# Patient Record
Sex: FEMALE | Race: BLACK OR AFRICAN AMERICAN | NOT HISPANIC OR LATINO | URBAN - METROPOLITAN AREA
[De-identification: names, ages, dates, MRNs, and addresses within clinical notes are randomized per-mention and may not be internally consistent; named-entity substitution may affect disease eponyms.]

---

## 2023-01-05 DIAGNOSIS — S82.232D CLOSED DISPLACED OBLIQUE FRACTURE OF SHAFT OF LEFT TIBIA WITH ROUTINE HEALING, SUBSEQUENT ENCOUNTER: Primary | ICD-10-CM

## 2023-01-09 ENCOUNTER — CLINICAL SUPPORT (OUTPATIENT)
Dept: REHABILITATION | Facility: HOSPITAL | Age: 38
End: 2023-01-09
Payer: MEDICAID

## 2023-01-09 DIAGNOSIS — M25.662 STIFFNESS OF LEFT KNEE: ICD-10-CM

## 2023-01-09 DIAGNOSIS — M25.672 STIFFNESS OF LEFT ANKLE JOINT: ICD-10-CM

## 2023-01-09 DIAGNOSIS — R26.89 OTHER ABNORMALITIES OF GAIT AND MOBILITY: ICD-10-CM

## 2023-01-09 DIAGNOSIS — M62.81 MUSCLE WEAKNESS: Primary | ICD-10-CM

## 2023-01-09 DIAGNOSIS — S82.232D CLOSED DISPLACED OBLIQUE FRACTURE OF SHAFT OF LEFT TIBIA WITH ROUTINE HEALING, SUBSEQUENT ENCOUNTER: ICD-10-CM

## 2023-01-09 PROCEDURE — 97110 THERAPEUTIC EXERCISES: CPT | Mod: PO

## 2023-01-09 PROCEDURE — 97161 PT EVAL LOW COMPLEX 20 MIN: CPT | Mod: PO

## 2023-01-09 NOTE — PLAN OF CARE
[facility]   Physical Therapy Initial Evaluation     Date: 1/9/2023   Name: Tiana Herr  Clinic Number: [id number]    Therapy Diagnosis: No diagnosis found.  Physician: Greg Dockery PA    Physician Orders: PT Eval and Treat   Medical Diagnosis from Referral: closed displaced oblique fracture of shaft of left tibia  Evaluation Date: 1/9/2023  Authorization Period Expiration: 12/31/23  Plan of Care Expiration: 4/9/23  Progress Note Due: 2/9/23  Visit # / Visits authorized: 1/ 1   FOTO: Issued Visit #: 1/3 (1/9/23)        Precautions: Standard and Weightbearing     Time In: 2:11 pm  Time Out: 2:55 pm  Total Appointment Time (timed & untimed codes): 44 minutes      SUBJECTIVE   Date of onset: December 14, 2022    History of current condition - Tiana reports: that she fell out of her attic. She had surgery the next day. She has been in the boot since she got out of the hospital. She has been walking around her house at night without the boot. She returns to her surgeon on February 10th. She was not instructed in any boot wearing schedule. She has not taken any pain pills in two weeks. It is a little achy and sore. She reports that her knee is really hurting a well. She has pain into the ankle as well. She does not have full mobility of the ankle. The fracture is right above the ankle. She states that her surgeon told her she is allowed to do as much as she can tolerate.2    Falls: yes, cause of initial injury    Imaging, x-ray: There is a nail in place across the distal tibial fracture with unchanged alignment. There is no clear hardware complication. There is patellar enthesopathy.    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    Surgical History:   Tiana Herr  has no past surgical history on file.    Medications:   Tiana currently has no medications in their medication list.    Allergies:   Review of patient's allergies indicates:  Not on File       OBJECTIVE      Left  AROM  PROM    Ankle Dorsiflexion  Lacking 15  Lacking 10    Ankle Plantarflexion  35  38    Ankle Inversion  12      Ankle Eversion  15          Right  AROM  PROM    Ankle Dorsiflexion  17  20    Ankle Plantarflexion  40  50    Ankle Inversion  35      Ankle Eversion  20         Left (not tested)  Right    Ankle Dorsiflexion  5/5    Ankle Plantarflexion  4/5    Ankle Inversion  5/5    Ankle Eversion  5/5       Left  Right    Knee Flexion  NT 4+/5    Knee Extension  NT 5/5    Hip Abduction 4-/5 4-/5    Hip Extension 4-/5 4-/5     Patient unable to perform straight leg raise on left      Left  Right    Knee Flexion  74 120    Knee Extension 3 degrees hyper 3 degrees hyper         Girth:      Left (cm)  Right (cm)    Girth  52 48     Gait:  Ambulates with boot, significant hip shift secondary to altered leg length from boot       Limitation/Restriction for FOTO  Survey    Therapist reviewed FOTO scores for Tiana Herr on 1/9/2023.   FOTO documents entered into [employer] - see Media section.    Limitation Score: 35%         TREATMENT     Plan for Next Visit:  Ankle passive range of motion/active range of motion, Neuromuscular Electrical Stimulation to quads (quad set, slr, Short arc quad), heel slides, hamstring strengthening, glute strengthening    Total Treatment time (time-based codes) separate from Evaluation: 12 minutes      Tiana received the treatments listed below:       therapeutic exercises to develop strength and endurance for 12 minutes including:  Long Sitting Quad Set with Towel Roll Under Heel - 1 x daily - 7 x weekly - 3 sets - 10 reps - 5 sec hold  Supine Ankle Pumps - 1 x daily - 7 x weekly - 3 sets - 10 reps      PATIENT EDUCATION AND HOME EXERCISES     Education provided:   - use of ice rather than heat to manage swelling  - importance of elevating for fluid drainage  - perform ambulation only with boot donned    Written Home Exercises Provided: yes. Exercises were reviewed and Tiana was able to demonstrate them prior to the end of the session.  Tiana demonstrated good  understanding of the education provided. See EMR under Patient Instructions for exercises provided during therapy sessions.    Access Code: [email or web address]  URL: [email or web address]  Date: 01/09/2023  Prepared by: Yadira Mar    Exercises  Long Sitting Quad Set with Towel Roll Under Heel - 1 x daily - 7 x weekly - 3 sets - 10 reps - 5 sec hold  Supine Ankle Pumps - 1 x daily - 7 x weekly - 3 sets - 10 reps      ASSESSMENT     Tiana is a 37 y.o. female referred to outpatient Physical Therapy with a medical diagnosis of closed displaced oblique fracture of shaft of left tibia. Patient presents with significant loss of ankle/knee passive range of motion, suspected weakness of ankle musculature, and significant quadriceps/hamstring weakness in right lower extremity. Patient is currently unable to perform straight leg raise on left lower extremity due to this weakness. Significant swelling is present as well. Patient was educated on avoiding unprotected weightbearing in the left lower extremity until more time has passed; she verbalized agreement to bear weight only in boot. Based on current presentation, physical therapy intervention is medically necessary to maximize functional outcomes.    Patient prognosis is Good.   Patientt will benefit from skilled outpatient Physical Therapy to address the  deficits stated above and in the chart below, provide patient /family education, and to maximize patientt's level of independence.     Plan of care discussed with patient: Yes  Patient's spiritual, cultural and educational needs considered and patient is agreeable to the plan of care and goals as stated below:     Anticipated Barriers for therapy: work schedule    Medical Necessity is demonstrated by the following  History  Co-morbidities and personal factors that may impact the plan of care Co-morbidities:   none    Personal Factors:   no deficits     low   Examination  Body Structures and Functions, activity limitations and participation restrictions that may impact the plan of care Body Regions:   lower extremities    Body Systems:    ROM  strength  balance  gait  transfers  motor control    Participation Restrictions:   recreation    Activity limitations:   Learning and applying knowledge  no deficits    General Tasks and Commands  no deficits    Communication  no deficits    Mobility  walking    Self care  washing oneself (bathing, drying, washing hands)  caring for body parts (brushing teeth, shaving, grooming)  dressing    Domestic Life  shopping  cooking  doing house work (cleaning house, washing dishes, laundry)    Interactions/Relationships  no deficits    Life Areas  employment    Community and Social Life  community life  recreation and leisure         moderate   Clinical Presentation stable and uncomplicated low   Decision Making/ Complexity Score: low     Goals:  Short Term Goals: 4 weeks   Patient will restore passive left ankle dorsiflexion range of motion to within 5 degrees of contralateral side.  Patient will restore passive left ankle plantarflexion range of motion to within 5 degrees of contralateral side.  Patient will restore passive left ankle inversion range of motion to within 5 degrees of contralateral side.  Patient will restore passive left ankle eversion range of motion to within 5  degrees of contralateral side.  Patient will be compliant with home exercise program.    Long Term Goals: 8 weeks   Patient will restore left active ankle dorsiflexion range of motion to within 5 degrees of contralateral side.  Patient will restore left active ankle plantarflexion range of motion to within 5 degrees of contralateral side.  Patient will restore left active ankle inversion range of motion to within 5 degrees of contralateral side.  Patient will restore  left active ankle eversion range of motion to within 5 degrees of contralateral side.  Patient will perform 30 repetitions of straight leg raise with no extensor lag indicating sufficient quad function for gait.  Patient will improve global left ankle strength to > or = 5/5 to allow return to prior level.   Patient will improve left quadriceps/hamstring strength to > or = 5/5 to allow return to prior level.  Patient will restore left knee flexion to > or = 120 degrees active in supine.  Patient will be independent in management.  Patient will ambulate 250' with no evidence of gait abnormalities.    PLAN   Plan of care Certification: 1/9/2023 to 4/9/23.      Outpatient Physical Therapy 2-3 times weekly for 8 weeks to include the following interventions: Gait Training, Manual Therapy, Moist Heat/ Ice, Neuromuscular Re-ed, Patient Education, Self Care, Therapeutic Activities, and Therapeutic Exercise.     Yadira Mar, PT      I CERTIFY THE NEED FOR THESE SERVICES FURNISHED UNDER THIS PLAN OF TREATMENT AND WHILE UNDER MY CARE   Physician's comments:     Physician's Signature: ___________________________________________________

## 2023-01-17 ENCOUNTER — CLINICAL SUPPORT (OUTPATIENT)
Dept: REHABILITATION | Facility: HOSPITAL | Age: 38
End: 2023-01-17
Payer: MEDICAID

## 2023-01-17 DIAGNOSIS — M25.672 STIFFNESS OF LEFT ANKLE JOINT: Primary | ICD-10-CM

## 2023-01-17 DIAGNOSIS — M25.662 STIFFNESS OF LEFT KNEE: ICD-10-CM

## 2023-01-17 DIAGNOSIS — R26.89 OTHER ABNORMALITIES OF GAIT AND MOBILITY: ICD-10-CM

## 2023-01-17 PROCEDURE — 97110 THERAPEUTIC EXERCISES: CPT | Mod: PO,CQ

## 2023-01-17 NOTE — PROGRESS NOTES
[facility]   Physical Therapy Treatment Note     Name: Tiana Herr  Clinic Number: [id number]    Therapy Diagnosis:   Encounter Diagnoses   Name Primary?    Stiffness of left ankle joint Yes    Other abnormalities of gait and mobility     Stiffness of left knee      Physician: Greg Dockery PA    Visit Date: 1/17/2023  Physician Orders: PT Eval and Treat   Medical Diagnosis from Referral: closed displaced oblique fracture of shaft of left tibia  Evaluation Date: 1/9/2023  Authorization Period Expiration: 12/31/23  Plan of Care Expiration: 4/9/23  Progress Note Due: 2/9/23  Visit # / Visits authorized: 1/ 20  FOTO: Issued Visit #: 1/3 (1/9/23)    PTA Visit #: 1/5     Precautions: Standard and Weightbearing     Time In: 1535  Time Out: 1628  Total Billable Time: 53 minutes    SUBJECTIVE     Pt reports: her ankle is coming along but her knee is really bothersome. Patient states her ankle is currently pain free. She was compliant with home exercise program.  Response to previous treatment: no adverse effects   Functional change: too soon to determine     Pain: 0/10  Location: left ankles     OBJECTIVE     Objective Measures updated at progress report unless specified.     Treatment     Tiana received the treatments listed below:      therapeutic exercises to develop strength, endurance, ROM, flexibility, posture, and core stabilization for 43 minutes including:  PROM to (L) ankle x 5 minute  Long sitting gastroc stretch with strap x 1 minute x 3   Long sitting ankle pumps x 2 minutes  Long sitting inversion/eversion x 2 minutes  Quad set 2x10  SAQ 2x10, 3#   SLR 2x5  S/L hip abduction 2x10    Seated windshield wiper x 2 minutes   Seated rockerboard x 2 minutes  Seated heel raises 2x10    manual therapy techniques: Joint mobilizations and Soft tissue Mobilization were applied to the: (L) ankle for 10 minutes, including:  A/P joint mobilizations to improve DF   Calcaneal mobs      Patient Education and Home Exercises     Home Exercises Provided and Patient Education Provided     Education provided:   - HEP compliance      Written Home Exercises Provided: Patient instructed to cont prior HEP. Exercises were reviewed and Tiana was able to demonstrate them prior to the end of the session.  Tiana demonstrated good  understanding of the education provided. See EMR under Patient Instructions for exercises provided during therapy sessions    ASSESSMENT     Tiana demonstrates decreased (L) ankle ROM in all planes. Focused a bit more on restoring DF today. Patient struggling with LE strengthening due to muscle weakness but she is able to perform without complaints of knee or ankle pain. Much improved ability to perform SLR today following quad sets and SAQ exercises.     Tiana Is progressing well towards her goals.   Pt prognosis is Good.     Pt will continue to benefit from skilled outpatient physical therapy to address the deficits listed in the problem list box on initial evaluation, provide pt/family education and to maximize pt's level of independence in the home and community environment.     Pt's spiritual, cultural and educational needs considered and pt agreeable to plan of care and goals.     Anticipated barriers to physical therapy: work schedule    Goals:   Short Term Goals: 4 weeks   Patient will restore passive left ankle dorsiflexion range of motion to within 5 degrees of contralateral side.  Patient will restore passive left ankle plantarflexion range of motion to within 5 degrees of contralateral side.  Patient will restore passive left ankle inversion range of motion to within 5 degrees of contralateral side.  Patient will restore passive left ankle eversion range of motion to within 5 degrees of contralateral side.  Patient will be compliant with home exercise program.     Long Term Goals: 8 weeks   Patient will restore left active ankle dorsiflexion range of motion to within 5  degrees of contralateral side.  Patient will restore left active ankle plantarflexion range of motion to within 5 degrees of contralateral side.  Patient will restore left active ankle inversion range of motion to within 5 degrees of contralateral side.  Patient will restore  left active ankle eversion range of motion to within 5 degrees of contralateral side.  Patient will perform 30 repetitions of straight leg raise with no extensor lag indicating sufficient quad function for gait.  Patient will improve global left ankle strength to > or = 5/5 to allow return to prior level.   Patient will improve left quadriceps/hamstring strength to > or = 5/5 to allow return to prior level.  Patient will restore left knee flexion to > or = 120 degrees active in supine.  Patient will be independent in management.  Patient will ambulate 250' with no evidence of gait abnormalities.    PLAN     Continue current POC with emphasis on restoring ankle ROM and LE strength.     Amelia Pan, PTA

## 2023-01-19 ENCOUNTER — CLINICAL SUPPORT (OUTPATIENT)
Dept: REHABILITATION | Facility: HOSPITAL | Age: 38
End: 2023-01-19
Payer: MEDICAID

## 2023-01-19 DIAGNOSIS — M25.662 STIFFNESS OF LEFT KNEE: ICD-10-CM

## 2023-01-19 DIAGNOSIS — R26.89 OTHER ABNORMALITIES OF GAIT AND MOBILITY: ICD-10-CM

## 2023-01-19 DIAGNOSIS — M25.672 STIFFNESS OF LEFT ANKLE JOINT: Primary | ICD-10-CM

## 2023-01-19 PROCEDURE — 97110 THERAPEUTIC EXERCISES: CPT | Mod: PO

## 2023-01-19 NOTE — PROGRESS NOTES
[facility]   Physical Therapy Treatment Note     Name: Tiana Herr  Clinic Number: [id number]    Therapy Diagnosis:   Encounter Diagnoses   Name Primary?    Stiffness of left ankle joint Yes    Other abnormalities of gait and mobility     Stiffness of left knee      Physician: Greg Dockery PA    Visit Date: 1/19/2023  Physician Orders: PT Eval and Treat   Medical Diagnosis from Referral: closed displaced oblique fracture of shaft of left tibia  Evaluation Date: 1/9/2023  Authorization Period Expiration: 12/31/23  Plan of Care Expiration: 4/9/23  Progress Note Due: 2/9/23  Visit # / Visits authorized: 1/ 20  FOTO: Issued Visit #: 1/3 (1/9/23)    PTA Visit #: 1/5     Precautions: Standard and Weightbearing     Time In: 4:00 pm  Time Out: 4:50 pm  Total Billable Time: 26 minutes    SUBJECTIVE     Pt reports: she feels she is improving.  She was compliant with home exercise program.  Response to previous treatment: no adverse effects   Functional change: too soon to determine     Pain: 0/10  Location: left ankles     OBJECTIVE     Objective Measures updated at progress report unless specified.     Treatment     Plan for Next Visit:  Resume mobilizations, knee range of motion     Tiana received the treatments listed below:      therapeutic exercises to develop strength, endurance, ROM, flexibility, posture, and core stabilization for 50 minutes including:  PROM to (L) ankle x 5 minute  Long sitting gastroc stretch with strap x 1 minute x 3   Long sitting ankle pumps x 5  Long sitting inversion/eversion x 2 minutes  Quad set 2x10  SAQ 3x10, 4#   SLR 2x10  S/L hip abduction 2x10    Seated Heel Raises 30x  Seated windshield wiper x 2 minutes   Seated rockerboard x 2 minutes      manual therapy techniques: Joint mobilizations and Soft tissue Mobilization were applied to the: (L) ankle for 00 minutes, including:  A/P joint mobilizations to improve DF   Calcaneal mobs     Patient  Education and Home Exercises     Home Exercises Provided and Patient Education Provided     Education provided:   - HEP compliance      Written Home Exercises Provided: Patient instructed to cont prior HEP. Exercises were reviewed and Tiana was able to demonstrate them prior to the end of the session.  Tiana demonstrated good  understanding of the education provided. See EMR under Patient Instructions for exercises provided during therapy sessions    ASSESSMENT     Significant improvements in quadriceps engagement noted this session with patient able to progress to sets of 10 repetitions. Verbal cues were required to attain terminal knee extension prior to lifting. Some improvements in neuromuscular control were noted with inversion/eversion with patient able to isolate ankle motion with decreased assistance. Continued physical therapy intervention is medically necessary to maximize functional outcomes.    Tiana Is progressing well towards her goals.   Pt prognosis is Good.     Pt will continue to benefit from skilled outpatient physical therapy to address the deficits listed in the problem list box on initial evaluation, provide pt/family education and to maximize pt's level of independence in the home and community environment.     Pt's spiritual, cultural and educational needs considered and pt agreeable to plan of care and goals.     Anticipated barriers to physical therapy: work schedule    Goals:   Short Term Goals: 4 weeks   Patient will restore passive left ankle dorsiflexion range of motion to within 5 degrees of contralateral side.  Patient will restore passive left ankle plantarflexion range of motion to within 5 degrees of contralateral side.  Patient will restore passive left ankle inversion range of motion to within 5 degrees of contralateral side.  Patient will restore passive left ankle eversion range of motion to within 5 degrees of contralateral side.  Patient will be compliant with home exercise  program.     Long Term Goals: 8 weeks   Patient will restore left active ankle dorsiflexion range of motion to within 5 degrees of contralateral side.  Patient will restore left active ankle plantarflexion range of motion to within 5 degrees of contralateral side.  Patient will restore left active ankle inversion range of motion to within 5 degrees of contralateral side.  Patient will restore  left active ankle eversion range of motion to within 5 degrees of contralateral side.  Patient will perform 30 repetitions of straight leg raise with no extensor lag indicating sufficient quad function for gait.  Patient will improve global left ankle strength to > or = 5/5 to allow return to prior level.   Patient will improve left quadriceps/hamstring strength to > or = 5/5 to allow return to prior level.  Patient will restore left knee flexion to > or = 120 degrees active in supine.  Patient will be independent in management.  Patient will ambulate 250' with no evidence of gait abnormalities.    PLAN     Continue current POC with emphasis on restoring ankle ROM and LE strength.     Yadira Mar, PT

## 2023-01-23 ENCOUNTER — CLINICAL SUPPORT (OUTPATIENT)
Dept: REHABILITATION | Facility: HOSPITAL | Age: 38
End: 2023-01-23
Payer: MEDICAID

## 2023-01-23 DIAGNOSIS — R26.89 OTHER ABNORMALITIES OF GAIT AND MOBILITY: ICD-10-CM

## 2023-01-23 DIAGNOSIS — M25.662 STIFFNESS OF LEFT KNEE: ICD-10-CM

## 2023-01-23 DIAGNOSIS — M25.672 STIFFNESS OF LEFT ANKLE JOINT: Primary | ICD-10-CM

## 2023-01-23 PROCEDURE — 97110 THERAPEUTIC EXERCISES: CPT | Mod: PO,CQ

## 2023-01-23 NOTE — PROGRESS NOTES
[facility]   Physical Therapy Treatment Note     Name: Tiana Herr  Clinic Number: [id number]    Therapy Diagnosis:   Encounter Diagnoses   Name Primary?    Stiffness of left ankle joint Yes    Other abnormalities of gait and mobility     Stiffness of left knee      Physician: Greg Dockery PA    Visit Date: 1/23/2023  Physician Orders: PT Eval and Treat   Medical Diagnosis from Referral: closed displaced oblique fracture of shaft of left tibia  Evaluation Date: 1/9/2023  Authorization Period Expiration: 12/31/23  Plan of Care Expiration: 4/9/23  Progress Note Due: 2/9/23  Visit # / Visits authorized: 3/ 20  FOTO: Issued Visit #: 1/3 (1/9/23)    PTA Visit #: 1/5     Precautions: Standard and Weightbearing     Time In: 1440  Time Out: 1525  Total Billable Time: 25 minutes    SUBJECTIVE     Pt reports: she is pain free today. Patient states she is feeling better and she is ready to get out of the boot. She was compliant with home exercise program.  Response to previous treatment: no adverse effects   Functional change: too soon to determine     Pain: 0/10  Location: left ankles     OBJECTIVE     Objective Measures updated at progress report unless specified.     Treatment     Tiana received the treatments listed below:      therapeutic exercises to develop strength, endurance, ROM, flexibility, posture, and core stabilization for 35 including:  PROM to (L) ankle x 5 minute  Long sitting gastroc stretch with strap x 1 minute x 3   Long sitting inversion/eversion x 2 minutes  4 way ankle (Red TB) 2x10  Quad set 2x10  SLR 2x10  S/L hip abduction 2x10    Seated Heel Raises 30x  Seated windshield wiper x 2 minutes   Seated rockerboard x 2 minutes  Narrow base on air ex pad x 1 minute x 2   Tomasa-Tomasa for gait mechanics x 1 minute ea LE     manual therapy techniques: Joint mobilizations and Soft tissue Mobilization were applied to the: (L) ankle for 10 minutes, including:  A/P joint  mobilizations to improve DF   Calcaneal mobs     Patient Education and Home Exercises     Home Exercises Provided and Patient Education Provided     Education provided:   - HEP compliance      Written Home Exercises Provided: Patient instructed to cont prior HEP. Exercises were reviewed and Tiana was able to demonstrate them prior to the end of the session.  Tiana demonstrated good  understanding of the education provided. See EMR under Patient Instructions for exercises provided during therapy sessions    ASSESSMENT     Tiana able to progress to ankle ROM against resistance without pain. Mild cues for ROM without compensation with good self correction achieved post instruction. Patient responding very well to progression of LE strengthening. Continued physical therapy intervention is medically necessary to maximize functional outcomes    Tiana Is progressing well towards her goals.   Pt prognosis is Good.     Pt will continue to benefit from skilled outpatient physical therapy to address the deficits listed in the problem list box on initial evaluation, provide pt/family education and to maximize pt's level of independence in the home and community environment.     Pt's spiritual, cultural and educational needs considered and pt agreeable to plan of care and goals.     Anticipated barriers to physical therapy: work schedule    Goals:   Short Term Goals: 4 weeks   Patient will restore passive left ankle dorsiflexion range of motion to within 5 degrees of contralateral side.  Patient will restore passive left ankle plantarflexion range of motion to within 5 degrees of contralateral side.  Patient will restore passive left ankle inversion range of motion to within 5 degrees of contralateral side.  Patient will restore passive left ankle eversion range of motion to within 5 degrees of contralateral side.  Patient will be compliant with home exercise program.     Long Term Goals: 8 weeks   Patient will restore left  active ankle dorsiflexion range of motion to within 5 degrees of contralateral side.  Patient will restore left active ankle plantarflexion range of motion to within 5 degrees of contralateral side.  Patient will restore left active ankle inversion range of motion to within 5 degrees of contralateral side.  Patient will restore  left active ankle eversion range of motion to within 5 degrees of contralateral side.  Patient will perform 30 repetitions of straight leg raise with no extensor lag indicating sufficient quad function for gait.  Patient will improve global left ankle strength to > or = 5/5 to allow return to prior level.   Patient will improve left quadriceps/hamstring strength to > or = 5/5 to allow return to prior level.  Patient will restore left knee flexion to > or = 120 degrees active in supine.  Patient will be independent in management.  Patient will ambulate 250' with no evidence of gait abnormalities.    PLAN     Continue current POC with emphasis on restoring ankle ROM and LE strength.     Amelia Pan, PTA

## 2023-01-25 ENCOUNTER — CLINICAL SUPPORT (OUTPATIENT)
Dept: REHABILITATION | Facility: HOSPITAL | Age: 38
End: 2023-01-25
Payer: MEDICAID

## 2023-01-25 DIAGNOSIS — M25.662 STIFFNESS OF LEFT KNEE: ICD-10-CM

## 2023-01-25 DIAGNOSIS — M25.672 STIFFNESS OF LEFT ANKLE JOINT: Primary | ICD-10-CM

## 2023-01-25 DIAGNOSIS — R26.89 OTHER ABNORMALITIES OF GAIT AND MOBILITY: ICD-10-CM

## 2023-01-25 PROCEDURE — 97110 THERAPEUTIC EXERCISES: CPT | Mod: PO,CQ

## 2023-01-25 PROCEDURE — 97140 MANUAL THERAPY 1/> REGIONS: CPT | Mod: PO,CQ

## 2023-01-25 NOTE — PROGRESS NOTES
[facility]   Physical Therapy Treatment Note     Name: Tiana Herr  Clinic Number: [id number]    Therapy Diagnosis:   Encounter Diagnoses   Name Primary?    Stiffness of left ankle joint Yes    Other abnormalities of gait and mobility     Stiffness of left knee      Physician: Greg Dockery PA    Visit Date: 1/25/2023  Physician Orders: PT Eval and Treat   Medical Diagnosis from Referral: closed displaced oblique fracture of shaft of left tibia  Evaluation Date: 1/9/2023  Authorization Period Expiration: 12/31/23  Plan of Care Expiration: 4/9/23  Progress Note Due: 2/9/23  Visit # / Visits authorized: 3/ 20  FOTO: Issued Visit #: 1/3 (1/9/23)    PTA Visit #: 1/5     Precautions: Standard and Weightbearing     Time In: 1536  Time Out: 1632  Total Billable Time: 56 minutes    SUBJECTIVE     Pt reports: her ankle was sore following last treatment session but this resolved the following day. She was compliant with home exercise program.  Response to previous treatment: no adverse effects   Functional change: too soon to determine     Pain: 0/10  Location: left ankles     OBJECTIVE     Objective Measures updated at progress report unless specified.     Treatment     Tiana received the treatments listed below:      therapeutic exercises to develop strength, endurance, ROM, flexibility, posture, and core stabilization for 46 including:  Upright bike x 5 minutes (no boot)  Standing gastroc stretch on incline x 1 minute x 2   Self DF mobilization on step x 2 minutes, 5 sec hold   PROM to (L) ankle x 5 minute  Long sitting gastroc stretch with strap x 1 minute x 3   4 way ankle (Red TB) 2x10  SLR 2x10, 2#  S/L hip abduction 2x10, 2#    Seated Heel Raises 30x  Seated windshield wiper x 2 minutes   Seated rockerboard x 2 minutes  Narrow base on air ex pad x 1 minute x 2 (eyes closed)  Tomasa-Tomasa for gait mechanics x 1 minute ea LE     manual therapy techniques: Joint mobilizations and Soft  tissue Mobilization were applied to the: (L) ankle for 10 minutes, including:  A/P joint mobilizations to improve DF   Calcaneal mobs     Patient Education and Home Exercises     Home Exercises Provided and Patient Education Provided     Education provided:   - HEP compliance      Written Home Exercises Provided: Patient instructed to cont prior HEP. Exercises were reviewed and Tiana was able to demonstrate them prior to the end of the session.  Tiana demonstrated good  understanding of the education provided. See EMR under Patient Instructions for exercises provided during therapy sessions    ASSESSMENT     Tiana initially challenged by full revolutions on upright bike due to decreased knee flexion but this resolved with repetition. Good tolerance to weightbearing mobility exercises without provocation of pain. Most ROM deficits noted into plantarflexion and dorsiflexion but she is able to challenge without pain. Continued physical therapy intervention is medically necessary to maximize functional outcomes    Tiana Is progressing well towards her goals.   Pt prognosis is Good.     Pt will continue to benefit from skilled outpatient physical therapy to address the deficits listed in the problem list box on initial evaluation, provide pt/family education and to maximize pt's level of independence in the home and community environment.     Pt's spiritual, cultural and educational needs considered and pt agreeable to plan of care and goals.     Anticipated barriers to physical therapy: work schedule    Goals:   Short Term Goals: 4 weeks   Patient will restore passive left ankle dorsiflexion range of motion to within 5 degrees of contralateral side.  Patient will restore passive left ankle plantarflexion range of motion to within 5 degrees of contralateral side.  Patient will restore passive left ankle inversion range of motion to within 5 degrees of contralateral side.  Patient will restore passive left ankle eversion  range of motion to within 5 degrees of contralateral side.  Patient will be compliant with home exercise program.     Long Term Goals: 8 weeks   Patient will restore left active ankle dorsiflexion range of motion to within 5 degrees of contralateral side.  Patient will restore left active ankle plantarflexion range of motion to within 5 degrees of contralateral side.  Patient will restore left active ankle inversion range of motion to within 5 degrees of contralateral side.  Patient will restore  left active ankle eversion range of motion to within 5 degrees of contralateral side.  Patient will perform 30 repetitions of straight leg raise with no extensor lag indicating sufficient quad function for gait.  Patient will improve global left ankle strength to > or = 5/5 to allow return to prior level.   Patient will improve left quadriceps/hamstring strength to > or = 5/5 to allow return to prior level.  Patient will restore left knee flexion to > or = 120 degrees active in supine.  Patient will be independent in management.  Patient will ambulate 250' with no evidence of gait abnormalities.    PLAN     Continue current POC with emphasis on restoring ankle ROM and LE strength.     Amelia Pan, PTA

## 2023-01-30 ENCOUNTER — CLINICAL SUPPORT (OUTPATIENT)
Dept: REHABILITATION | Facility: HOSPITAL | Age: 38
End: 2023-01-30
Payer: MEDICAID

## 2023-01-30 DIAGNOSIS — R26.89 OTHER ABNORMALITIES OF GAIT AND MOBILITY: ICD-10-CM

## 2023-01-30 DIAGNOSIS — M25.672 STIFFNESS OF LEFT ANKLE JOINT: Primary | ICD-10-CM

## 2023-01-30 DIAGNOSIS — M25.662 STIFFNESS OF LEFT KNEE: ICD-10-CM

## 2023-01-30 PROCEDURE — 97110 THERAPEUTIC EXERCISES: CPT | Mod: PO,CQ

## 2023-01-30 NOTE — PROGRESS NOTES
[facility]   Physical Therapy Treatment Note     Name: Tiana Herr  Clinic Number: [id number]    Therapy Diagnosis:   Encounter Diagnoses   Name Primary?    Stiffness of left ankle joint Yes    Other abnormalities of gait and mobility     Stiffness of left knee      Physician: Greg Dockery PA    Visit Date: 1/30/2023  Physician Orders: PT Eval and Treat   Medical Diagnosis from Referral: closed displaced oblique fracture of shaft of left tibia  Evaluation Date: 1/9/2023  Authorization Period Expiration: 12/31/23  Plan of Care Expiration: 4/9/23  Progress Note Due: 2/9/23  Visit # / Visits authorized: 5/ 20  FOTO: Issued Visit #: 1/3 (1/9/23)    PTA Visit #: 3/5     Precautions: Standard and Weightbearing     Time In: 1532  Time Out: 1627  Total Billable Time: 55 minutes    SUBJECTIVE     Pt reports: her ankle is doing well and she is walking some at home with tennis shoes. She was compliant with home exercise program.  Response to previous treatment: no adverse effects   Functional change: too soon to determine     Pain: 0/10  Location: left ankles     OBJECTIVE     Objective Measures updated at progress report unless specified.     Treatment     Tiana received the treatments listed below:      therapeutic exercises to develop strength, endurance, ROM, flexibility, posture, and core stabilization for 45 including:  Upright bike x 5 minutes (no boot)  Standing gastroc stretch on incline x 1 minute x 2   Self DF mobilization on step x 2 minutes, 5 sec hold   PROM to (L) ankle x 5 minute  Long sitting gastroc stretch with strap x 1 minute x 3   4 way ankle (Red TB) 2x10  SLR 2x10, 2#  S/L hip abduction 2x10, 2#    Seated Heel Raises 30x  Seated windshield wiper x 2 minutes   Seated rockerboard x 2 minutes  Narrow base on air ex pad x 1 minute x 2 (eyes closed)  Tomasa-Tomasa for gait mechanics x 1 minute ea LE     manual therapy techniques: Joint mobilizations and Soft tissue  Mobilization were applied to the: (L) ankle for 10 minutes, including:  A/P joint mobilizations to improve DF   Calcaneal mobs     Patient Education and Home Exercises     Home Exercises Provided and Patient Education Provided     Education provided:   - HEP compliance      Written Home Exercises Provided: Patient instructed to cont prior HEP. Exercises were reviewed and Tiana was able to demonstrate them prior to the end of the session.  Tiana demonstrated good  understanding of the education provided. See EMR under Patient Instructions for exercises provided during therapy sessions    ASSESSMENT     Tiana demonstrates most ROM deficits into plantarflexion and dorsiflexion but she is able to challenge without pain. Patient to bring tennis shoes to next visit to progress ambulatory specific exercises. Continued physical therapy intervention is medically necessary to maximize functional outcomes    Tiana Is progressing well towards her goals.   Pt prognosis is Good.     Pt will continue to benefit from skilled outpatient physical therapy to address the deficits listed in the problem list box on initial evaluation, provide pt/family education and to maximize pt's level of independence in the home and community environment.     Pt's spiritual, cultural and educational needs considered and pt agreeable to plan of care and goals.     Anticipated barriers to physical therapy: work schedule    Goals:   Short Term Goals: 4 weeks   Patient will restore passive left ankle dorsiflexion range of motion to within 5 degrees of contralateral side.  Patient will restore passive left ankle plantarflexion range of motion to within 5 degrees of contralateral side.  Patient will restore passive left ankle inversion range of motion to within 5 degrees of contralateral side.  Patient will restore passive left ankle eversion range of motion to within 5 degrees of contralateral side.  Patient will be compliant with home exercise program.      Long Term Goals: 8 weeks   Patient will restore left active ankle dorsiflexion range of motion to within 5 degrees of contralateral side.  Patient will restore left active ankle plantarflexion range of motion to within 5 degrees of contralateral side.  Patient will restore left active ankle inversion range of motion to within 5 degrees of contralateral side.  Patient will restore  left active ankle eversion range of motion to within 5 degrees of contralateral side.  Patient will perform 30 repetitions of straight leg raise with no extensor lag indicating sufficient quad function for gait.  Patient will improve global left ankle strength to > or = 5/5 to allow return to prior level.   Patient will improve left quadriceps/hamstring strength to > or = 5/5 to allow return to prior level.  Patient will restore left knee flexion to > or = 120 degrees active in supine.  Patient will be independent in management.  Patient will ambulate 250' with no evidence of gait abnormalities.    PLAN     Continue current POC with emphasis on restoring ankle ROM and LE strength.     Amelia Pan, PTA

## 2023-02-01 ENCOUNTER — CLINICAL SUPPORT (OUTPATIENT)
Dept: REHABILITATION | Facility: HOSPITAL | Age: 38
End: 2023-02-01
Payer: MEDICAID

## 2023-02-01 DIAGNOSIS — R26.89 OTHER ABNORMALITIES OF GAIT AND MOBILITY: ICD-10-CM

## 2023-02-01 DIAGNOSIS — M25.672 STIFFNESS OF LEFT ANKLE JOINT: Primary | ICD-10-CM

## 2023-02-01 DIAGNOSIS — M25.662 STIFFNESS OF LEFT KNEE: ICD-10-CM

## 2023-02-01 PROCEDURE — 97110 THERAPEUTIC EXERCISES: CPT | Mod: PO,CQ

## 2023-02-01 NOTE — PROGRESS NOTES
[facility]   Physical Therapy Treatment Note     Name: Tiana Herr  Clinic Number: [id number]    Therapy Diagnosis:   Encounter Diagnoses   Name Primary?    Stiffness of left ankle joint Yes    Other abnormalities of gait and mobility     Stiffness of left knee      Physician: Greg Dockery PA    Visit Date: 2/1/2023  Physician Orders: PT Eval and Treat   Medical Diagnosis from Referral: closed displaced oblique fracture of shaft of left tibia  Evaluation Date: 1/9/2023  Authorization Period Expiration: 12/31/23  Plan of Care Expiration: 4/9/23  Progress Note Due: 2/9/23  Visit # / Visits authorized: 6/ 20  FOTO: Issued Visit #: 1/3 (1/9/23)    PTA Visit #: 4/5     Precautions: Standard and Weightbearing     Time In: 1543  Time Out: 1640  Total Billable Time: 55 minutes    SUBJECTIVE     Pt reports: she is ready to walk in tennis shoes. Patient states she plans to attend [employer] game this weekend. She was compliant with home exercise program.  Response to previous treatment: no adverse effects   Functional change: too soon to determine     Pain: 0/10  Location: left ankles     OBJECTIVE     Objective Measures updated at progress report unless specified.     Treatment     Tiana received the treatments listed below:      therapeutic exercises to develop strength, endurance, ROM, flexibility, posture, and core stabilization for 45 including:  Upright bike x 5 minutes (no boot)  Standing gastroc stretch on incline x 1 minute x 2   Self DF mobilization on step x 2 minutes, 5 sec hold   PROM to (L) ankle x 5 minutes  Long sitting gastroc stretch with strap x 1 minute x 3   CW/CCW circles x 10  4 way ankle (Red TB) 2x10  SLR 2x10, 2#  S/L hip abduction 2x10, 2#      Seated rockerboard x 2 minutes  Standing heel raises 2x10  Narrow base on air ex pad x 1 minute x 2 (eyes closed)  Tomasa-Tomasa over half foam for gait mechanics x 1 minute ea LE   Ambulation in gym with tennis shoes x 5  minutes     manual therapy techniques: Joint mobilizations and Soft tissue Mobilization were applied to the: (L) ankle for 10 minutes, including:  A/P joint mobilizations to improve DF   Calcaneal mobs     Patient Education and Home Exercises     Home Exercises Provided and Patient Education Provided     Education provided:   - HEP compliance      Written Home Exercises Provided: Patient instructed to cont prior HEP. Exercises were reviewed and Tiana was able to demonstrate them prior to the end of the session.  Tiana demonstrated good  understanding of the education provided. See EMR under Patient Instructions for exercises provided during therapy sessions    ASSESSMENT     Tiana presents to clinic in tennis shoes this date as she feels ready to progress out of CAM boot. Patient has decreased stance time, lack of heel strike, and decreased push off noted on (L) LE requiring frequent cues for correction. Mild knee pain during ambulation but no complaints of ankle discomfort. DF ROM remains limited and will continue to focus on restoring to achieve normal gait mehcanics.    Tiana Is progressing well towards her goals.   Pt prognosis is Good.     Pt will continue to benefit from skilled outpatient physical therapy to address the deficits listed in the problem list box on initial evaluation, provide pt/family education and to maximize pt's level of independence in the home and community environment.     Pt's spiritual, cultural and educational needs considered and pt agreeable to plan of care and goals.     Anticipated barriers to physical therapy: work schedule    Goals:   Short Term Goals: 4 weeks   Patient will restore passive left ankle dorsiflexion range of motion to within 5 degrees of contralateral side.  Patient will restore passive left ankle plantarflexion range of motion to within 5 degrees of contralateral side.  Patient will restore passive left ankle inversion range of motion to within 5 degrees of  contralateral side.  Patient will restore passive left ankle eversion range of motion to within 5 degrees of contralateral side.  Patient will be compliant with home exercise program.     Long Term Goals: 8 weeks   Patient will restore left active ankle dorsiflexion range of motion to within 5 degrees of contralateral side.  Patient will restore left active ankle plantarflexion range of motion to within 5 degrees of contralateral side.  Patient will restore left active ankle inversion range of motion to within 5 degrees of contralateral side.  Patient will restore  left active ankle eversion range of motion to within 5 degrees of contralateral side.  Patient will perform 30 repetitions of straight leg raise with no extensor lag indicating sufficient quad function for gait.  Patient will improve global left ankle strength to > or = 5/5 to allow return to prior level.   Patient will improve left quadriceps/hamstring strength to > or = 5/5 to allow return to prior level.  Patient will restore left knee flexion to > or = 120 degrees active in supine.  Patient will be independent in management.  Patient will ambulate 250' with no evidence of gait abnormalities.    PLAN     Continue current POC with emphasis on restoring ankle ROM and LE strength.     Amelia Pan, PTA

## 2023-02-08 ENCOUNTER — CLINICAL SUPPORT (OUTPATIENT)
Dept: REHABILITATION | Facility: HOSPITAL | Age: 38
End: 2023-02-08
Payer: MEDICAID

## 2023-02-08 DIAGNOSIS — M25.662 STIFFNESS OF LEFT KNEE: ICD-10-CM

## 2023-02-08 DIAGNOSIS — M25.672 STIFFNESS OF LEFT ANKLE JOINT: Primary | ICD-10-CM

## 2023-02-08 DIAGNOSIS — R26.89 OTHER ABNORMALITIES OF GAIT AND MOBILITY: ICD-10-CM

## 2023-02-08 PROCEDURE — 97110 THERAPEUTIC EXERCISES: CPT | Mod: PO,CQ

## 2023-02-08 NOTE — PROGRESS NOTES
[facility]   Physical Therapy Treatment Note     Name: Tiana Herr  Clinic Number: [id number]    Therapy Diagnosis:   Encounter Diagnoses   Name Primary?    Stiffness of left ankle joint Yes    Other abnormalities of gait and mobility     Stiffness of left knee        Physician: Greg Dockery PA    Visit Date: 2/8/2023  Physician Orders: PT Eval and Treat   Medical Diagnosis from Referral: closed displaced oblique fracture of shaft of left tibia  Evaluation Date: 1/9/2023  Authorization Period Expiration: 12/31/23  Plan of Care Expiration: 4/9/23  Progress Note Due: 2/9/23  Visit # / Visits authorized: 7/ 20  FOTO: Issued Visit #: 1/3 (1/9/23)    PTA Visit #: 5/5     Precautions: Standard and Weightbearing     Time In: 1535  Time Out: 1630  Total Billable Time: 30 minutes    SUBJECTIVE     Pt reports: she went to a concert on Friday and the [employer] game on Saturday. Patient states her calf and leg have been more sore since these activities. Patient states she wore shoes, not her boot. Patient to follow up with MD later this week. She was compliant with home exercise program.  Response to previous treatment: no adverse effects   Functional change: too soon to determine     Pain: 0/10  Location: left ankles     OBJECTIVE     Objective Measures updated at progress report unless specified.     Treatment     Tiana received the treatments listed below:      therapeutic exercises to develop strength, endurance, ROM, flexibility, posture, and core stabilization for 45 including:  Upright bike x 5 minutes (no boot)  Standing gastroc stretch on incline x 1 minute x 2   Self DF mobilization on step x 2 minutes, 5 sec hold   PROM to (L) ankle x 5 minutes  CW/CCW circles x 10 (big ROM)  4 way ankle (Green TB) 2x10  SLR 2x10, 2#  S/L hip abduction 2x10, 2#    Seated rockerboard x 2 minutes, 5 sec hold in DF  Standing heel raises 2x10  Narrow base on air ex pad x 1 minute x 2 (eyes  closed)  Tandem stance x 20 sec (B)   Tomasa-Tomasa over half foam for gait mechanics x 1 minute ea LE   Ambulation in gym with tennis shoes x 5 minutes   BLE shuttle squats 3x10, 62.5#    manual therapy techniques: Joint mobilizations and Soft tissue Mobilization were applied to the: (L) ankle for 10 minutes, including:  A/P joint mobilizations to improve DF   Calcaneal mobs     Patient Education and Home Exercises     Home Exercises Provided and Patient Education Provided     Education provided:   - HEP compliance      Written Home Exercises Provided: Patient instructed to cont prior HEP. Exercises were reviewed and Tiana was able to demonstrate them prior to the end of the session.  Tiana demonstrated good  understanding of the education provided. See EMR under Patient Instructions for exercises provided during therapy sessions    ASSESSMENT     Tiana presents to clinic in tennis shoes this date as she feels ready to progress out of CAM boot. Patient lacking push off but this does improve with cueing for correction. Patient able to progress to tandem stance and shuttle squats without adverse effects. Increased swelling noted today which is likely due to increased time standing over the weekend.    Tiana Is progressing well towards her goals.   Pt prognosis is Good.     Pt will continue to benefit from skilled outpatient physical therapy to address the deficits listed in the problem list box on initial evaluation, provide pt/family education and to maximize pt's level of independence in the home and community environment.     Pt's spiritual, cultural and educational needs considered and pt agreeable to plan of care and goals.     Anticipated barriers to physical therapy: work schedule    Goals:   Short Term Goals: 4 weeks   Patient will restore passive left ankle dorsiflexion range of motion to within 5 degrees of contralateral side.  Patient will restore passive left ankle plantarflexion range of motion to within 5  degrees of contralateral side.  Patient will restore passive left ankle inversion range of motion to within 5 degrees of contralateral side.  Patient will restore passive left ankle eversion range of motion to within 5 degrees of contralateral side.  Patient will be compliant with home exercise program.     Long Term Goals: 8 weeks   Patient will restore left active ankle dorsiflexion range of motion to within 5 degrees of contralateral side.  Patient will restore left active ankle plantarflexion range of motion to within 5 degrees of contralateral side.  Patient will restore left active ankle inversion range of motion to within 5 degrees of contralateral side.  Patient will restore  left active ankle eversion range of motion to within 5 degrees of contralateral side.  Patient will perform 30 repetitions of straight leg raise with no extensor lag indicating sufficient quad function for gait.  Patient will improve global left ankle strength to > or = 5/5 to allow return to prior level.   Patient will improve left quadriceps/hamstring strength to > or = 5/5 to allow return to prior level.  Patient will restore left knee flexion to > or = 120 degrees active in supine.  Patient will be independent in management.  Patient will ambulate 250' with no evidence of gait abnormalities.    PLAN     Continue current POC with emphasis on restoring ankle ROM and LE strength.     Amelia Pan, PTA

## 2023-02-13 ENCOUNTER — CLINICAL SUPPORT (OUTPATIENT)
Dept: REHABILITATION | Facility: HOSPITAL | Age: 38
End: 2023-02-13
Payer: MEDICAID

## 2023-02-13 DIAGNOSIS — M25.672 STIFFNESS OF LEFT ANKLE JOINT: Primary | ICD-10-CM

## 2023-02-13 DIAGNOSIS — R26.89 OTHER ABNORMALITIES OF GAIT AND MOBILITY: ICD-10-CM

## 2023-02-13 DIAGNOSIS — M25.662 STIFFNESS OF LEFT KNEE: ICD-10-CM

## 2023-02-13 PROCEDURE — 97110 THERAPEUTIC EXERCISES: CPT | Mod: PO

## 2023-02-13 NOTE — PROGRESS NOTES
[facility]   Physical Therapy Progress Note     Name: Tiana Herr  Clinic Number: [id number]    Therapy Diagnosis:   Encounter Diagnoses   Name Primary?    Stiffness of left ankle joint Yes    Other abnormalities of gait and mobility     Stiffness of left knee          Physician: Greg Dockery PA    Visit Date: 2/13/2023  Physician Orders: PT Eval and Treat   Medical Diagnosis from Referral: closed displaced oblique fracture of shaft of left tibia  Evaluation Date: 1/9/2023  Authorization Period Expiration: 12/31/23  Plan of Care Expiration: 4/9/23  Progress Note Due: 2/9/23  Visit # / Visits authorized: 9/ 20  FOTO: Issued Visit #: 1/3 (1/9/23)    PTA Visit #: 0/5     Precautions: Standard and Weightbearing     Time In: 2:10 pm  Time Out: 2:58 pm  Total Billable Time: 48 minutes    SUBJECTIVE     Pt reports: that she saw the doctor on Friday and was cleared from the boot. She will see him again next week to hopefully be fully released from his care. She has been having medial lower leg pain where she broke it. The surgeon told her that this is where it si healing.    She was compliant with home exercise program.  Response to previous treatment: no adverse effects   Functional change: too soon to determine     Pain: 0/10  Location: left ankles     OBJECTIVE     Objective Measures updated at progress report unless specified.         Left  AROM  PROM    Ankle Dorsiflexion  1  6    Ankle Plantarflexion  40 42    Ankle Inversion 30 35    Ankle Eversion 15 20                              Right  AROM  PROM    Ankle Dorsiflexion  17  20    Ankle Plantarflexion  40  50    Ankle Inversion  35      Ankle Eversion  20           Left (not tested)  Right    Ankle Dorsiflexion  5/5 5/5    Ankle Plantarflexion   4/5    Ankle Inversion  4/5 5/5    Ankle Eversion  5/5 5/5         Left  Right    Knee Flexion  4/5 4+/5    Knee Extension  4+/5 5/5    Hip Abduction 4-/5 4-/5    Hip Extension 4-/5  4-/5      Patient able to perform straight leg raise         Left  Right    Knee Flexion  120 120    Knee Extension 3 degrees hyper 3 degrees hyper             Treatment     Tiana received the treatments listed below:      therapeutic exercises to develop strength, endurance, ROM, flexibility, posture, and core stabilization for 48 including:  Upright bike x 5 minutes (no boot)  Standing gastroc stretch on incline x 1 minute x 2   Self DF mobilization on step x 2 minutes, 5 sec hold  NP  PROM to (L) ankle x 5 minutes NP  CW/CCW circles x 10 (big ROM)  4 way ankle (Green TB) 2x10 NP  S/L hip abduction 2x10, 2# NP    Seated rockerboard x 2 minutes, 5 sec hold in DF  Standing heel raises on wedge with eccentric lowering 30x  Narrow base on air ex pad x 1 minute x 2 (eyes closed) NP  Tandem stance x 20 sec (B) NP  Tomasa-Tomasa over half foam for gait mechanics x 1 minute ea LE  NP  Ambulation in gym with tennis shoes x 5 minutes NP  BLE shuttle squats 3x10, 62.5#    manual therapy techniques: Joint mobilizations and Soft tissue Mobilization were applied to the: (L) ankle for 00 minutes, including:  A/P joint mobilizations to improve DF   Calcaneal mobs     Patient Education and Home Exercises     Home Exercises Provided and Patient Education Provided     Education provided:   - HEP compliance      Written Home Exercises Provided: Patient instructed to cont prior HEP. Exercises were reviewed and Tiana was able to demonstrate them prior to the end of the session.  Tiana demonstrated good  understanding of the education provided. See EMR under Patient Instructions for exercises provided during therapy sessions    ASSESSMENT     Patient was assessed for progress this session. While patient has made good gains in range of motion, significant deficits persist. Quadriceps function has improved greatly with patient now able to perform straight leg raise with no lag; however, some strength deficits persist. Knee range of motion has  normalized to contralateral side. She did have a mild functional decline per FOTO; however, she feels that this is because she is having soreness from being out of the boot. Weightbearing eccentric heel raises added to promote eccentric tissue lengthening with fair tolerance. Patient unable to tolerate self mobilizations this session secondary to mortise sore[ness. Continued physical therapy intervention is medically necessary to maximize functional outcomes.      Tiana Is progressing well towards her goals.   Pt prognosis is Good.     Pt will continue to benefit from skilled outpatient physical therapy to address the deficits listed in the problem list box on initial evaluation, provide pt/family education and to maximize pt's level of independence in the home and community environment.     Pt's spiritual, cultural and educational needs considered and pt agreeable to plan of care and goals.     Anticipated barriers to physical therapy: work schedule    Goals:   Short Term Goals: 4 weeks (progressing, not met)  Patient will restore passive left ankle dorsiflexion range of motion to within 5 degrees of contralateral side.  Patient will restore passive left ankle plantarflexion range of motion to within 5 degrees of contralateral side.  Patient will restore passive left ankle inversion range of motion to within 5 degrees of contralateral side.  Patient will restore passive left ankle eversion range of motion to within 5 degrees of contralateral side.  Patient will be compliant with home exercise program.     Long Term Goals: 8 weeks  (progressing, not met)  Patient will restore left active ankle dorsiflexion range of motion to within 5 degrees of contralateral side.  Patient will restore left active ankle plantarflexion range of motion to within 5 degrees of contralateral side.  Patient will restore left active ankle inversion range of motion to within 5 degrees of contralateral side.  Patient will restore  left  active ankle eversion range of motion to within 5 degrees of contralateral side.  Patient will perform 30 repetitions of straight leg raise with no extensor lag indicating sufficient quad function for gait. Met.  Patient will improve global left ankle strength to > or = 5/5 to allow return to prior level.   Patient will improve left quadriceps/hamstring strength to > or = 5/5 to allow return to prior level.  Patient will restore left knee flexion to > or = 120 degrees active in supine. Met.  Patient will be independent in management.  Patient will ambulate 250' with no evidence of gait abnormalities.    PLAN     Continue current POC with emphasis on restoring ankle ROM and LE strength.     Yadira Mar, PT

## 2023-02-15 ENCOUNTER — CLINICAL SUPPORT (OUTPATIENT)
Dept: REHABILITATION | Facility: HOSPITAL | Age: 38
End: 2023-02-15
Payer: MEDICAID

## 2023-02-15 DIAGNOSIS — M25.662 STIFFNESS OF LEFT KNEE: ICD-10-CM

## 2023-02-15 DIAGNOSIS — M25.672 STIFFNESS OF LEFT ANKLE JOINT: Primary | ICD-10-CM

## 2023-02-15 DIAGNOSIS — R26.89 OTHER ABNORMALITIES OF GAIT AND MOBILITY: ICD-10-CM

## 2023-02-15 PROCEDURE — 97110 THERAPEUTIC EXERCISES: CPT | Mod: PO,CQ

## 2023-02-15 NOTE — PROGRESS NOTES
[facility]   Physical Therapy Progress Note     Name: Tiana Herr  Clinic Number: [id number]    Therapy Diagnosis:   Encounter Diagnoses   Name Primary?    Stiffness of left ankle joint Yes    Other abnormalities of gait and mobility     Stiffness of left knee          Physician: Greg Dockery PA    Visit Date: 2/15/2023  Physician Orders: PT Eval and Treat   Medical Diagnosis from Referral: closed displaced oblique fracture of shaft of left tibia  Evaluation Date: 1/9/2023  Authorization Period Expiration: 12/31/23  Plan of Care Expiration: 4/9/23  Progress Note Due: 2/9/23  Visit # / Visits authorized: 9/ 20  FOTO: Issued Visit #: 1/3 (1/9/23)    PTA Visit #: 1/5     Precautions: Standard and Weightbearing     Time In:1539  Time Out: 1635  Total Billable Time: 55 minutes    SUBJECTIVE     Pt reports: her lower leg and ankle have been very sore. Patient states she is limping more today. She was compliant with home exercise program.  Response to previous treatment: no adverse effects   Functional change: too soon to determine     Pain: 5/10  Location: left ankles     OBJECTIVE       Treatment     Tiana received the treatments listed below:      therapeutic exercises to develop strength, endurance, ROM, flexibility, posture, and core stabilization for 40 including:  Upright bike x 5 minutes (no boot)  Standing gastroc stretch on incline x 1 minute x 2   Self DF mobilization on step x 2 minutes, 5 sec hold   PROM to (L) ankle x 5 minutes NP  CW/CCW circles x 10 (big ROM)  4 way ankle (Green TB) 2x10  S/L hip abduction 2x10, 2# NP    Seated rockerboard x 2 minutes, 5 sec hold in DF  Standing heel raises on wedge with eccentric lowering 30x NP  Narrow base on air ex pad x 1 minute x 2 (eyes closed) NP  Tandem stance x 20 sec (B) NP  Tomasa-Tomasa over half foam for gait mechanics x 1 minute ea LE  NP  Ambulation in gym with tennis shoes x 5 minutes NP  BLE shuttle squats 3x10, 62.5#  Heel  raises on shuttle 2x10, 50#     manual therapy techniques: Joint mobilizations and Soft tissue Mobilization were applied to the: (L) ankle for 15 minutes, including:  A/P joint mobilizations to improve DF   STM to gastoc soleus complex     Patient Education and Home Exercises     Home Exercises Provided and Patient Education Provided     Education provided:   - HEP compliance      Written Home Exercises Provided: Patient instructed to cont prior HEP. Exercises were reviewed and Tiana was able to demonstrate them prior to the end of the session.  Tiana demonstrated good  understanding of the education provided. See EMR under Patient Instructions for exercises provided during therapy sessions    ASSESSMENT     Tiana demonstrating continued swelling through distal 1/3 of (L) lower leg. Improvements in gastroc tissue mobility and ankle ROM achieved with manual therapy techniques. Patient demonstrating antalgic gait this session with minimal ROM through ankle and walking on flexed knee.     Tiana Is progressing well towards her goals.   Pt prognosis is Good.     Pt will continue to benefit from skilled outpatient physical therapy to address the deficits listed in the problem list box on initial evaluation, provide pt/family education and to maximize pt's level of independence in the home and community environment.     Pt's spiritual, cultural and educational needs considered and pt agreeable to plan of care and goals.     Anticipated barriers to physical therapy: work schedule    Goals:   Short Term Goals: 4 weeks (progressing, not met)  Patient will restore passive left ankle dorsiflexion range of motion to within 5 degrees of contralateral side.  Patient will restore passive left ankle plantarflexion range of motion to within 5 degrees of contralateral side.  Patient will restore passive left ankle inversion range of motion to within 5 degrees of contralateral side.  Patient will restore passive left ankle eversion  range of motion to within 5 degrees of contralateral side.  Patient will be compliant with home exercise program.     Long Term Goals: 8 weeks  (progressing, not met)  Patient will restore left active ankle dorsiflexion range of motion to within 5 degrees of contralateral side.  Patient will restore left active ankle plantarflexion range of motion to within 5 degrees of contralateral side.  Patient will restore left active ankle inversion range of motion to within 5 degrees of contralateral side.  Patient will restore  left active ankle eversion range of motion to within 5 degrees of contralateral side.  Patient will perform 30 repetitions of straight leg raise with no extensor lag indicating sufficient quad function for gait. Met.  Patient will improve global left ankle strength to > or = 5/5 to allow return to prior level.   Patient will improve left quadriceps/hamstring strength to > or = 5/5 to allow return to prior level.  Patient will restore left knee flexion to > or = 120 degrees active in supine. Met.  Patient will be independent in management.  Patient will ambulate 250' with no evidence of gait abnormalities.    PLAN     Continue current POC with emphasis on restoring ankle ROM and LE strength.     Amelia Pan, PTA

## 2023-02-27 ENCOUNTER — CLINICAL SUPPORT (OUTPATIENT)
Dept: REHABILITATION | Facility: HOSPITAL | Age: 38
End: 2023-02-27
Payer: MEDICAID

## 2023-02-27 DIAGNOSIS — M25.662 STIFFNESS OF LEFT KNEE: ICD-10-CM

## 2023-02-27 DIAGNOSIS — M25.672 STIFFNESS OF LEFT ANKLE JOINT: Primary | ICD-10-CM

## 2023-02-27 DIAGNOSIS — R26.89 OTHER ABNORMALITIES OF GAIT AND MOBILITY: ICD-10-CM

## 2023-02-27 PROCEDURE — 97110 THERAPEUTIC EXERCISES: CPT | Mod: PO,CQ

## 2023-02-27 NOTE — PROGRESS NOTES
[facility]   Physical Therapy Progress Note     Name: Tiana Herr  Clinic Number: [id number]    Therapy Diagnosis:   Encounter Diagnoses   Name Primary?    Stiffness of left ankle joint Yes    Other abnormalities of gait and mobility     Stiffness of left knee          Physician: Greg Dockery PA    Visit Date: 2/27/2023  Physician Orders: PT Eval and Treat   Medical Diagnosis from Referral: closed displaced oblique fracture of shaft of left tibia  Evaluation Date: 1/9/2023  Authorization Period Expiration: 12/31/23  Plan of Care Expiration: 4/9/23  Progress Note Due: 2/9/23  Visit # / Visits authorized: 10/ 20  FOTO: Issued Visit #: 1/3 (1/9/23)    PTA Visit #: 2/5     Precautions: Standard and Weightbearing     Time In: 1537  Time Out: 1630  Total Billable Time: 53 minutes    SUBJECTIVE     Pt reports: her foot is sore and swollen. She was compliant with home exercise program.  Response to previous treatment: no adverse effects   Functional change: too soon to determine     Pain: 5/10  Location: left ankles     OBJECTIVE       Treatment     Tiana received the treatments listed below:      therapeutic exercises to develop strength, endurance, ROM, flexibility, posture, and core stabilization for 40 including:  Upright bike x 5 minutes   Standing gastroc stretch on incline x 1 minute x 2   Self DF mobilization on step x 2 minutes, 5 sec hold   PROM to (L) ankle x 5 minutes  CW/CCW circles x 10 (big ROM)  4 way ankle (Green TB) 2x10  S/L hip abduction 2x10, 2# NP    Seated rockerboard x 2 minutes, 5 sec hold in DF  Seated inversion sweep 2x10  Seated arch dome x 2 minutes    Narrow base on air ex pad x 1 minute x 2 (eyes closed)   Tandem stance x 20 sec (B) NP  Tomasa-Tomasa over half foam for gait mechanics x 1 minute ea LE  NP  Ambulation in gym with tennis shoes x 5 minutes NP  BLE shuttle squats 3x10, 62.5#  Heel raises on shuttle 2x10, 50#     manual therapy techniques: Joint  mobilizations and Soft tissue Mobilization were applied to the: (L) ankle for 13 minutes, including:  A/P joint mobilizations to improve DF   STM to gastoc soleus complex     Patient Education and Home Exercises     Home Exercises Provided and Patient Education Provided     Education provided:   - HEP compliance      Written Home Exercises Provided: Patient instructed to cont prior HEP. Exercises were reviewed and Tiana was able to demonstrate them prior to the end of the session.  Tiana demonstrated good  understanding of the education provided. See EMR under Patient Instructions for exercises provided during therapy sessions    ASSESSMENT     Tiana does continue with swelling near anteromedial ankle but joint mobility and overall motion much improved today. Patient continues with antalgic gait but she relates this to knee pain, not ankle pain. Performed arch specific activities today without adverse effects.     Tiana Is progressing well towards her goals.   Pt prognosis is Good.     Pt will continue to benefit from skilled outpatient physical therapy to address the deficits listed in the problem list box on initial evaluation, provide pt/family education and to maximize pt's level of independence in the home and community environment.     Pt's spiritual, cultural and educational needs considered and pt agreeable to plan of care and goals.     Anticipated barriers to physical therapy: work schedule    Goals:   Short Term Goals: 4 weeks (progressing, not met)  Patient will restore passive left ankle dorsiflexion range of motion to within 5 degrees of contralateral side.  Patient will restore passive left ankle plantarflexion range of motion to within 5 degrees of contralateral side.  Patient will restore passive left ankle inversion range of motion to within 5 degrees of contralateral side.  Patient will restore passive left ankle eversion range of motion to within 5 degrees of contralateral side.  Patient will be  compliant with home exercise program.     Long Term Goals: 8 weeks  (progressing, not met)  Patient will restore left active ankle dorsiflexion range of motion to within 5 degrees of contralateral side.  Patient will restore left active ankle plantarflexion range of motion to within 5 degrees of contralateral side.  Patient will restore left active ankle inversion range of motion to within 5 degrees of contralateral side.  Patient will restore  left active ankle eversion range of motion to within 5 degrees of contralateral side.  Patient will perform 30 repetitions of straight leg raise with no extensor lag indicating sufficient quad function for gait. Met.  Patient will improve global left ankle strength to > or = 5/5 to allow return to prior level.   Patient will improve left quadriceps/hamstring strength to > or = 5/5 to allow return to prior level.  Patient will restore left knee flexion to > or = 120 degrees active in supine. Met.  Patient will be independent in management.  Patient will ambulate 250' with no evidence of gait abnormalities.    PLAN     Continue current POC with emphasis on restoring ankle ROM and LE strength.     Amelia Pan, PTA

## 2023-03-08 ENCOUNTER — CLINICAL SUPPORT (OUTPATIENT)
Dept: REHABILITATION | Facility: HOSPITAL | Age: 38
End: 2023-03-08
Payer: MEDICAID

## 2023-03-08 DIAGNOSIS — M25.672 STIFFNESS OF LEFT ANKLE JOINT: Primary | ICD-10-CM

## 2023-03-08 DIAGNOSIS — R26.89 OTHER ABNORMALITIES OF GAIT AND MOBILITY: ICD-10-CM

## 2023-03-08 DIAGNOSIS — M25.662 STIFFNESS OF LEFT KNEE: ICD-10-CM

## 2023-03-08 PROCEDURE — 97110 THERAPEUTIC EXERCISES: CPT | Mod: PO,CQ

## 2023-03-08 NOTE — PROGRESS NOTES
[facility]   Physical Therapy Progress Note     Name: Tiana Herr  Clinic Number: [id number]    Therapy Diagnosis:   Encounter Diagnoses   Name Primary?    Stiffness of left ankle joint Yes    Other abnormalities of gait and mobility     Stiffness of left knee          Physician: Greg Dockery PA    Visit Date: 3/8/2023  Physician Orders: PT Eval and Treat   Medical Diagnosis from Referral: closed displaced oblique fracture of shaft of left tibia  Evaluation Date: 1/9/2023  Authorization Period Expiration: 12/31/23  Plan of Care Expiration: 4/9/23  Progress Note Due: 2/9/23  Visit # / Visits authorized: 11/ 20  FOTO: Issued Visit #: 1/3 (1/9/23)    PTA Visit #: 2/5     Precautions: Standard and Weightbearing     Time In: 3:30 pm  Time Out: 3:55 pm  Total Billable Time: 25 minutes    SUBJECTIVE     Pt reports: Pain in the front of the leg (shin). Pt will return to the doctor on March 24. Pt has some intermittent sharp pain in the knee that comes and goes.  She was compliant with home exercise program.  Response to previous treatment: no adverse effects   Functional change: too soon to determine     Pain: 5/10  Location: left ankles     OBJECTIVE       Tiana received the treatments listed below:      therapeutic exercises to develop strength, endurance, ROM, flexibility, posture, and core stabilization for 25 including:    Upright bike x 5 minutes   Standing gastroc stretch on incline x 1 minute x 2   Self DF mobilization on step x 2 minutes, 5 sec hold   BLE shuttle squats 2x10, 75#  Heel raises on shuttle 2x10, 50#   Tandem stance x 30 sec (B)   4 way ankle (Green TB) 2x10    Not performed:  PROM to (L) ankle x 5 minutes  CW/CCW circles x 10 (big ROM)  S/L hip abduction 2x10, 2# NP  Seated rockerboard x 2 minutes, 5 sec hold in DF  Seated inversion sweep 2x10  Seated arch dome x 2 minutes  [place]arrow base on air ex pad x 1 minute x 2 (eyes closed)   Tomasa-Tomasa over half foam for gait  mechanics x 1 minute ea LE  NP  Ambulation in gym with tennis shoes x 5 minutes NP      manual therapy techniques: Joint mobilizations and Soft tissue Mobilization were applied to the: (L) ankle for 0 minutes, including:  A/P joint mobilizations to improve DF   STM to gastoc soleus complex     Patient Education and Home Exercises     Home Exercises Provided and Patient Education Provided     Education provided:   - HEP compliance      Written Home Exercises Provided: Patient instructed to cont prior HEP. Exercises were reviewed and Tiana was able to demonstrate them prior to the end of the session.  Tiana demonstrated good  understanding of the education provided. See EMR under Patient Instructions for exercises provided during therapy sessions    ASSESSMENT     Treatment was shortened today due to patient needing to leave early.  Pt able to increase the weight on the shuttle today with no increase in pain.  Noted swelling from the knee to the ankle.  Will continue working on improving standing balance and progressing within tolerance.    Tiana Is progressing well towards her goals.   Pt prognosis is Good.     Pt will continue to benefit from skilled outpatient physical therapy to address the deficits listed in the problem list box on initial evaluation, provide pt/family education and to maximize pt's level of independence in the home and community environment.     Pt's spiritual, cultural and educational needs considered and pt agreeable to plan of care and goals.     Anticipated barriers to physical therapy: work schedule    Goals:   Short Term Goals: 4 weeks (progressing, not met)  Patient will restore passive left ankle dorsiflexion range of motion to within 5 degrees of contralateral side.  Patient will restore passive left ankle plantarflexion range of motion to within 5 degrees of contralateral side.  Patient will restore passive left ankle inversion range of motion to within 5 degrees of contralateral  side.  Patient will restore passive left ankle eversion range of motion to within 5 degrees of contralateral side.  Patient will be compliant with home exercise program.     Long Term Goals: 8 weeks  (progressing, not met)  Patient will restore left active ankle dorsiflexion range of motion to within 5 degrees of contralateral side.  Patient will restore left active ankle plantarflexion range of motion to within 5 degrees of contralateral side.  Patient will restore left active ankle inversion range of motion to within 5 degrees of contralateral side.  Patient will restore  left active ankle eversion range of motion to within 5 degrees of contralateral side.  Patient will perform 30 repetitions of straight leg raise with no extensor lag indicating sufficient quad function for gait. Met.  Patient will improve global left ankle strength to > or = 5/5 to allow return to prior level.   Patient will improve left quadriceps/hamstring strength to > or = 5/5 to allow return to prior level.  Patient will restore left knee flexion to > or = 120 degrees active in supine. Met.  Patient will be independent in management.  Patient will ambulate 250' with no evidence of gait abnormalities.    PLAN     Continue current POC with emphasis on restoring ankle ROM and LE strength.     Trinidad White, PTA

## 2023-03-10 ENCOUNTER — CLINICAL SUPPORT (OUTPATIENT)
Dept: REHABILITATION | Facility: HOSPITAL | Age: 38
End: 2023-03-10
Payer: MEDICAID

## 2023-03-10 DIAGNOSIS — M25.662 STIFFNESS OF LEFT KNEE: ICD-10-CM

## 2023-03-10 DIAGNOSIS — M25.672 STIFFNESS OF LEFT ANKLE JOINT: Primary | ICD-10-CM

## 2023-03-10 DIAGNOSIS — R26.89 OTHER ABNORMALITIES OF GAIT AND MOBILITY: ICD-10-CM

## 2023-03-10 PROCEDURE — 97110 THERAPEUTIC EXERCISES: CPT | Mod: PO,CQ

## 2023-03-10 NOTE — PROGRESS NOTES
[facility]   Physical Therapy Progress Note     Name: Tiana Herr  Clinic Number: [id number]    Therapy Diagnosis:   Encounter Diagnoses   Name Primary?    Stiffness of left ankle joint Yes    Other abnormalities of gait and mobility     Stiffness of left knee        Physician: Greg Dockery PA    Visit Date: 3/10/2023  Physician Orders: PT Eval and Treat   Medical Diagnosis from Referral: closed displaced oblique fracture of shaft of left tibia  Evaluation Date: 1/9/2023  Authorization Period Expiration: 12/31/23  Plan of Care Expiration: 4/9/23  Progress Note Due: 2/9/23  Visit # / Visits authorized: 12/ 12  FOTO: Issued Visit #: 1/3 (1/9/23)    PTA Visit #: 2/5     Precautions: Standard and Weightbearing     Time In: 1210 (patient late)  Time Out: 1250  Total Billable Time: 40 minutes    SUBJECTIVE     Pt reports: she is walking better and she doesn't have any pain today. 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 She was compliant with home exercise program.  Response to previous treatment: no adverse effects   Functional change: too soon to determine     Pain: 5/10  Location: left ankles     OBJECTIVE     Tiana received the treatments listed below:      therapeutic exercises to develop strength, endurance, ROM, flexibility, posture, and core stabilization for 40 including:    Upright bike x 5 minutes   Standing gastroc stretch on incline x 1 minute x 2   Self DF mobilization on step x 2 minutes, 5 sec hold   BLE shuttle squats 3x10, 75#  Heel raises on shuttle 2x10, 50#   Tandem stance on ground x 30 sec (B)   Tandem stance on air ex pad x 30 sec (B)   SL stance x 30 sec (B)   SL stance: Y balance x 10 ea     Ankle ABCs x 2   PROM to (L) ankle x 5 minutes  S/L hip abduction 2x10, 2# NP  4 way ankle (Green TB) 2x10  Seated inversion sweep 2x10  Seated arch dome x 2 minutes    manual therapy techniques: Joint mobilizations and Soft tissue  Mobilization were applied to the: (L) ankle for 00 minutes, including:  A/P joint mobilizations to improve DF     Patient Education and Home Exercises     Home Exercises Provided and Patient Education Provided     Education provided:   - HEP compliance      Written Home Exercises Provided: Patient instructed to cont prior HEP. Exercises were reviewed and Tiana was able to demonstrate them prior to the end of the session.  Tiana demonstrated good  understanding of the education provided. See EMR under Patient Instructions for exercises provided during therapy sessions    ASSESSMENT     Tiana demonstrating much improved gait mechanics upon entry to clinic today. (L) SL stance is challenging for patient when compared to (R). Able to progress to dynamic balance activities without adverse effects.     Tiana Is progressing well towards her goals.   Pt prognosis is Good.     Pt will continue to benefit from skilled outpatient physical therapy to address the deficits listed in the problem list box on initial evaluation, provide pt/family education and to maximize pt's level of independence in the home and community environment.     Pt's spiritual, cultural and educational needs considered and pt agreeable to plan of care and goals.     Anticipated barriers to physical therapy: work schedule    Goals:   Short Term Goals: 4 weeks (progressing, not met)  Patient will restore passive left ankle dorsiflexion range of motion to within 5 degrees of contralateral side.  Patient will restore passive left ankle plantarflexion range of motion to within 5 degrees of contralateral side.  Patient will restore passive left ankle inversion range of motion to within 5 degrees of contralateral side.  Patient will restore passive left ankle eversion range of motion to within 5 degrees of contralateral side.  Patient will be compliant with home exercise program.     Long Term Goals: 8 weeks  (progressing, not met)  Patient will restore left  active ankle dorsiflexion range of motion to within 5 degrees of contralateral side.  Patient will restore left active ankle plantarflexion range of motion to within 5 degrees of contralateral side.  Patient will restore left active ankle inversion range of motion to within 5 degrees of contralateral side.  Patient will restore  left active ankle eversion range of motion to within 5 degrees of contralateral side.  Patient will perform 30 repetitions of straight leg raise with no extensor lag indicating sufficient quad function for gait. Met.  Patient will improve global left ankle strength to > or = 5/5 to allow return to prior level.   Patient will improve left quadriceps/hamstring strength to > or = 5/5 to allow return to prior level.  Patient will restore left knee flexion to > or = 120 degrees active in supine. Met.  Patient will be independent in management.  Patient will ambulate 250' with no evidence of gait abnormalities.    PLAN     Continue current POC with emphasis on restoring ankle ROM and LE strength.     Amelia Pan, PTA

## 2023-03-16 ENCOUNTER — CLINICAL SUPPORT (OUTPATIENT)
Dept: REHABILITATION | Facility: HOSPITAL | Age: 38
End: 2023-03-16
Payer: MEDICAID

## 2023-03-16 DIAGNOSIS — M25.672 STIFFNESS OF LEFT ANKLE JOINT: Primary | ICD-10-CM

## 2023-03-16 DIAGNOSIS — R26.89 OTHER ABNORMALITIES OF GAIT AND MOBILITY: ICD-10-CM

## 2023-03-16 DIAGNOSIS — M25.662 STIFFNESS OF LEFT KNEE: ICD-10-CM

## 2023-03-16 PROCEDURE — 97110 THERAPEUTIC EXERCISES: CPT | Mod: PO

## 2023-03-16 NOTE — PROGRESS NOTES
"[facility]   Physical Therapy Progress Note     Name: Tiana Herr  Clinic Number: [id number]    Therapy Diagnosis:   Encounter Diagnoses   Name Primary?    Stiffness of left ankle joint Yes    Other abnormalities of gait and mobility     Stiffness of left knee        Physician: Greg Dockery PA    Visit Date: 3/16/2023  Physician Orders: PT Eval and Treat   Medical Diagnosis from Referral: closed displaced oblique fracture of shaft of left tibia  Evaluation Date: 2023  Authorization Period Expiration: 23  Plan of Care Expiration: 23  Progress Note Due: 23  Visit # / Visits authorized:   FOTO: Issued Visit #: 2/3 (3/16/23)    PTA Visit #:      Precautions: Standard and Weightbearing     Time In: 1408 (patient late)  Time Out: 1455  Total Billable Time: 47 minutes    SUBJECTIVE     Pt reports: that her knee is hurting. She overall feels like she's doing pretty well, but she does not feel like she's able to return to normal exercise. She feels like she still has deficits that need physical therapy intervention.   She was compliant with home exercise program.  Response to previous treatment: no adverse effects   Functional change: ambulating out of boot    Pain: 5/10  Location: left ankles     OBJECTIVE     Left ankle dorsiflexion:   2 degrees actively  5 degrees passively  Strength: 4/5    Left ankle plantarflexion:  40 degrees actively  45 degrees passively  Strength:    Left ankle inversion:  40 degrees actively  Strength: 5/5 *painful    Left ankle eversion:  12 degrees actively  Strength: 5/5 *painful    Quadriceps: 5/5  Hamstrin/5    +saphenous and posterior tibial nerve tension    Mild gait abnormalities.    Tiana received the treatments listed below:      therapeutic exercises to develop strength, endurance, ROM, flexibility, posture, and core stabilization for 47 including:    Assessment of Status  Saphenous nerve glides 3x10  Squats to 24" " step 3x10  Posterior tibial nerve glides x 30  Tandem stance on ground x 30 sec (B)   Tandem stance on air ex pad x 30 sec (B)   SL stance x 30 sec (B)   SL stance: Y balance x 5 ea     Not performed:  Upright bike x 5 minutes   Standing gastroc stretch on incline x 1 minute x 2   Self DF mobilization on step x 2 minutes, 5 sec hold   BLE shuttle squats 3x10, 75#  Heel raises on shuttle 2x10, 50#   Ankle ABCs x 2   PROM to (L) ankle x 5 minutes  S/L hip abduction 2x10, 2# NP  4 way ankle (Green TB) 2x10  Seated inversion sweep 2x10  Seated arch dome x 2 minutes    manual therapy techniques: Joint mobilizations and Soft tissue Mobilization were applied to the: (L) ankle for 00 minutes, including:  A/P joint mobilizations to improve DF     Patient Education and Home Exercises     Home Exercises Provided and Patient Education Provided     Education provided:   - HEP compliance      Written Home Exercises Provided: Patient instructed to cont prior HEP. Exercises were reviewed and Tiana was able to demonstrate them prior to the end of the session.  Tiana demonstrated good  understanding of the education provided. See EMR under Patient Instructions for exercises provided during therapy sessions    ASSESSMENT     Patient was assessed for progress this session. She has made great gains in range of motion and strength; however, some deficits persist. Neural tension was noted this session; therefore, nerve glides were added to address this with good tolerance. Patient continues to be limited in functional closed chain activities, including squats and single limb loading. Increased emphasis will be placed on these activities moving forward. Based on current presentation, continued physical therapy intervention is medically necessary to allow full return to prior functional level.    Tiana Is progressing well towards her goals.   Pt prognosis is Good.     Pt will continue to benefit from skilled outpatient physical therapy to  address the deficits listed in the problem list box on initial evaluation, provide pt/family education and to maximize pt's level of independence in the home and community environment.     Pt's spiritual, cultural and educational needs considered and pt agreeable to plan of care and goals.     Anticipated barriers to physical therapy: work schedule    Goals:   Short Term Goals: 4 weeks (progressing, not met)  Patient will restore passive left ankle dorsiflexion range of motion to within 5 degrees of contralateral side. Not met  Patient will restore passive left ankle plantarflexion range of motion to within 5 degrees of contralateral side. Not met  Patient will restore passive left ankle inversion range of motion to within 5 degrees of contralateral side. Met  Patient will restore passive left ankle eversion range of motion to within 5 degrees of contralateral side. Met  Patient will be compliant with home exercise program. Met     Long Term Goals: 8 weeks  (progressing, not met)  Patient will restore left active ankle dorsiflexion range of motion to within 5 degrees of contralateral side. Not met  Patient will restore left active ankle plantarflexion range of motion to within 5 degrees of contralateral side. Not met  Patient will restore left active ankle inversion range of motion to within 5 degrees of contralateral side. Met  Patient will restore  left active ankle eversion range of motion to within 5 degrees of contralateral side. Met  Patient will perform 30 repetitions of straight leg raise with no extensor lag indicating sufficient quad function for gait. Met.  Patient will improve global left ankle strength to > or = 5/5 to allow return to prior level. Partially met  Patient will improve left quadriceps/hamstring strength to > or = 5/5 to allow return to prior level. Not met..  Patient will restore left knee flexion to > or = 120 degrees active in supine. Met.  Patient will be independent in management. Not  met.  Patient will ambulate 250' with no evidence of gait abnormalities. Not met.    PLAN     Continue current POC with emphasis on restoring ankle ROM and LE strength.     Yadira Mar, PT

## 2023-03-24 ENCOUNTER — CLINICAL SUPPORT (OUTPATIENT)
Dept: REHABILITATION | Facility: HOSPITAL | Age: 38
End: 2023-03-24
Payer: MEDICAID

## 2023-03-24 DIAGNOSIS — R26.89 OTHER ABNORMALITIES OF GAIT AND MOBILITY: ICD-10-CM

## 2023-03-24 DIAGNOSIS — M25.662 STIFFNESS OF LEFT KNEE: ICD-10-CM

## 2023-03-24 DIAGNOSIS — M25.672 STIFFNESS OF LEFT ANKLE JOINT: Primary | ICD-10-CM

## 2023-03-24 PROCEDURE — 97110 THERAPEUTIC EXERCISES: CPT | Mod: PO

## 2023-03-24 NOTE — PROGRESS NOTES
[facility]   Physical Therapy Progress Note     Name: Tiana Herr  Clinic Number: [id number]    Therapy Diagnosis:   Encounter Diagnoses   Name Primary?    Stiffness of left ankle joint Yes    Other abnormalities of gait and mobility     Stiffness of left knee        Physician: Greg Dockery PA    Visit Date: 3/24/2023  Physician Orders: PT Eval and Treat   Medical Diagnosis from Referral: closed displaced oblique fracture of shaft of left tibia  Evaluation Date: 2023  Authorization Period Expiration: 23  Plan of Care Expiration: 23  Progress Note Due: 23  Visit # / Visits authorized:   FOTO: Issued Visit #: 2/3 (3/16/23)    PTA Visit #:      Precautions: Standard and Weightbearing     Time In: 1105 (patient late)  Time Out: 1139  Total Billable Time: 34 minutes    SUBJECTIVE     Pt reports: that her knee is hurting. She overall feels like she's doing pretty well, but she does not feel like she's able to return to normal exercise. She feels like she still has deficits that need physical therapy intervention.   She was compliant with home exercise program.  Response to previous treatment: no adverse effects   Functional change: ambulating out of boot    Pain: 5/10  Location: left ankles     OBJECTIVE     Left ankle dorsiflexion:   2 degrees actively  5 degrees passively  Strength: 4/5    Left ankle plantarflexion:  40 degrees actively  45 degrees passively  Strength:    Left ankle inversion:  40 degrees actively  Strength: 5/5 *painful    Left ankle eversion:  12 degrees actively  Strength: 5/5 *painful    Quadriceps: 5/5  Hamstrin/5    +saphenous and posterior tibial nerve tension    Mild gait abnormalities.    Meaghan's sign: negative    Tiana received the treatments listed below:      therapeutic exercises to develop strength, endurance, ROM, flexibility, posture, and core stabilization for 14 including:  Saphenous nerve glides 2x20   Peroneal  "nerve glides 2x20      NP:  Assessment of Status  Saphenous nerve glides 3x10  Squats to 24" step 3x10  Posterior tibial nerve glides x 30  Tandem stance on ground x 30 sec (B)   Tandem stance on air ex pad x 30 sec (B)   SL stance x 30 sec (B)   SL stance: Y balance x 5 ea     Not performed:  Upright bike x 5 minutes   Standing gastroc stretch on incline x 1 minute x 2   Self DF mobilization on step x 2 minutes, 5 sec hold   BLE shuttle squats 3x10, 75#  Heel raises on shuttle 2x10, 50#   Ankle ABCs x 2   PROM to (L) ankle x 5 minutes  S/L hip abduction 2x10, 2# NP  4 way ankle (Green TB) 2x10  Seated inversion sweep 2x10  Seated arch dome x 2 minutes    manual therapy techniques: Joint mobilizations and Soft tissue Mobilization were applied to the: (L) ankle for 20 minutes, including:  A/P joint mobilizations to improve DF   Assessment of edema    10 min ice at end of session    Patient Education and Home Exercises     Home Exercises Provided and Patient Education Provided     Education provided:   - HEP compliance      Written Home Exercises Provided: Patient instructed to cont prior HEP. Exercises were reviewed and Tiana was able to demonstrate them prior to the end of the session.  Tiana demonstrated good  understanding of the education provided. See EMR under Patient Instructions for exercises provided during therapy sessions    ASSESSMENT     Patient had significant edema with some numbness and tingling this session. Meaghan's sign was negative with no other symptoms of DVT. Patient educated on icing and elevating to reduce swelling. Patient performed nerve glides to attempt to reduce numbness. Patient instructed in signs to monitor for DVT or other concerns that may necessitate return to physician or ED. Patient verbalized agreement. Continued physical therapy intervention is medically necessary to maximize functional outcomes.    Tiana Is progressing well towards her goals.   Pt prognosis is Good.     Pt will " continue to benefit from skilled outpatient physical therapy to address the deficits listed in the problem list box on initial evaluation, provide pt/family education and to maximize pt's level of independence in the home and community environment.     Pt's spiritual, cultural and educational needs considered and pt agreeable to plan of care and goals.     Anticipated barriers to physical therapy: work schedule    Goals:   Short Term Goals: 4 weeks (progressing, not met)  Patient will restore passive left ankle dorsiflexion range of motion to within 5 degrees of contralateral side. Not met  Patient will restore passive left ankle plantarflexion range of motion to within 5 degrees of contralateral side. Not met  Patient will restore passive left ankle inversion range of motion to within 5 degrees of contralateral side. Met  Patient will restore passive left ankle eversion range of motion to within 5 degrees of contralateral side. Met  Patient will be compliant with home exercise program. Met     Long Term Goals: 8 weeks  (progressing, not met)  Patient will restore left active ankle dorsiflexion range of motion to within 5 degrees of contralateral side. Not met  Patient will restore left active ankle plantarflexion range of motion to within 5 degrees of contralateral side. Not met  Patient will restore left active ankle inversion range of motion to within 5 degrees of contralateral side. Met  Patient will restore  left active ankle eversion range of motion to within 5 degrees of contralateral side. Met  Patient will perform 30 repetitions of straight leg raise with no extensor lag indicating sufficient quad function for gait. Met.  Patient will improve global left ankle strength to > or = 5/5 to allow return to prior level. Partially met  Patient will improve left quadriceps/hamstring strength to > or = 5/5 to allow return to prior level. Not met..  Patient will restore left knee flexion to > or = 120 degrees active  in supine. Met.  Patient will be independent in management. Not met.  Patient will ambulate 250' with no evidence of gait abnormalities. Not met.    PLAN     Continue current POC with emphasis on restoring ankle ROM and LE strength.     Yadira Mar, PT

## 2023-03-27 ENCOUNTER — CLINICAL SUPPORT (OUTPATIENT)
Dept: REHABILITATION | Facility: HOSPITAL | Age: 38
End: 2023-03-27
Payer: MEDICAID

## 2023-03-27 DIAGNOSIS — M25.672 STIFFNESS OF LEFT ANKLE JOINT: Primary | ICD-10-CM

## 2023-03-27 DIAGNOSIS — R26.89 OTHER ABNORMALITIES OF GAIT AND MOBILITY: ICD-10-CM

## 2023-03-27 DIAGNOSIS — M25.662 STIFFNESS OF LEFT KNEE: ICD-10-CM

## 2023-03-27 PROCEDURE — 97110 THERAPEUTIC EXERCISES: CPT | Mod: PO,CQ

## 2023-03-27 NOTE — PROGRESS NOTES
"[facility]   Physical Therapy Progress Note     Name: Tiana Herr  Clinic Number: [id number]    Therapy Diagnosis:   Encounter Diagnoses   Name Primary?    Stiffness of left ankle joint Yes    Other abnormalities of gait and mobility     Stiffness of left knee        Physician: Greg Dockery PA    Visit Date: 3/27/2023  Physician Orders: PT Eval and Treat   Medical Diagnosis from Referral: closed displaced oblique fracture of shaft of left tibia  Evaluation Date: 1/9/2023  Authorization Period Expiration: 12/31/23  Plan of Care Expiration: 4/9/23  Progress Note Due: 4/9/23  Visit # / Visits authorized: 15/17  FOTO: Issued Visit #: 2/3 (3/16/23)    PTA Visit #: 1/5     Precautions: Standard and Weightbearing     Time In: 1531  Time Out: 1625  Total Billable Time: 54 minutes    SUBJECTIVE     Pt reports: her ankle doesn't feel as swollen today. Patient states she doesn't have pain today, only soreness. Patient states her numbness and tingling hasn't changed much. She was compliant with home exercise program.  Response to previous treatment: no adverse effects   Functional change: ambulating out of boot    Pain: 3/10  Location: left ankles     OBJECTIVE     Tiana received the treatments listed below:      therapeutic exercises to develop strength, endurance, ROM, flexibility, posture, and core stabilization for 30 including:  Saphenous nerve glides 2x20   Peroneal nerve glides 2x20  Long sitting gastroc stretch with strap x 1 minute x 3   CW/CCW circles x 20 ea direction  4 way ankle (Green TB) 2x10    BLE shuttle squats 3x10, 87.5#  SL shuttle squats 2x10, 50#   Heel raises on shuttle 2x10, 50#   Standing gastroc stretch on incline x 1 minute x 2   Self DF mobilization on step x 2 minutes, 5 sec hold     Not performed today:  Squats to 24" step 3x10  Tandem stance on ground x 30 sec (B)   Tandem stance on air ex pad x 30 sec (B)   SL stance x 30 sec (B)   SL stance: Y balance x 5 " ea   Upright bike x 5 minutes   Ankle ABCs x 2   S/L hip abduction 2x10, 2# NP  Seated inversion sweep 2x10  Seated arch dome x 2 minutes    manual therapy techniques: Joint mobilizations and Soft tissue Mobilization were applied to the: (L) ankle for 20 minutes, including:  A/P joint mobilizations to improve DF     Tiana received 10 min ice at end of session (elevated).    Patient Education and Home Exercises     Home Exercises Provided and Patient Education Provided     Education provided:   - HEP compliance      Written Home Exercises Provided: Patient instructed to cont prior HEP. Exercises were reviewed and Tiana was able to demonstrate them prior to the end of the session.  Tiana demonstrated good  understanding of the education provided. See EMR under Patient Instructions for exercises provided during therapy sessions    ASSESSMENT     Swelling not as pronounced this date but did allow time for ice and elevation. Good tolerance to re-introduction of therapeutic exercise without complaints of pain or increased swelling. ROM steadily improving but limitations into DF remain. Continued physical therapy intervention is medically necessary to maximize functional outcomes.    Tiana Is progressing well towards her goals.   Pt prognosis is Good.     Pt will continue to benefit from skilled outpatient physical therapy to address the deficits listed in the problem list box on initial evaluation, provide pt/family education and to maximize pt's level of independence in the home and community environment.     Pt's spiritual, cultural and educational needs considered and pt agreeable to plan of care and goals.     Anticipated barriers to physical therapy: work schedule    Goals:   Short Term Goals: 4 weeks (progressing, not met)  Patient will restore passive left ankle dorsiflexion range of motion to within 5 degrees of contralateral side. Not met  Patient will restore passive left ankle plantarflexion range of motion to  within 5 degrees of contralateral side. Not met  Patient will restore passive left ankle inversion range of motion to within 5 degrees of contralateral side. Met  Patient will restore passive left ankle eversion range of motion to within 5 degrees of contralateral side. Met  Patient will be compliant with home exercise program. Met     Long Term Goals: 8 weeks  (progressing, not met)  Patient will restore left active ankle dorsiflexion range of motion to within 5 degrees of contralateral side. Not met  Patient will restore left active ankle plantarflexion range of motion to within 5 degrees of contralateral side. Not met  Patient will restore left active ankle inversion range of motion to within 5 degrees of contralateral side. Met  Patient will restore  left active ankle eversion range of motion to within 5 degrees of contralateral side. Met  Patient will perform 30 repetitions of straight leg raise with no extensor lag indicating sufficient quad function for gait. Met.  Patient will improve global left ankle strength to > or = 5/5 to allow return to prior level. Partially met  Patient will improve left quadriceps/hamstring strength to > or = 5/5 to allow return to prior level. Not met..  Patient will restore left knee flexion to > or = 120 degrees active in supine. Met.  Patient will be independent in management. Not met.  Patient will ambulate 250' with no evidence of gait abnormalities. Not met.    PLAN     Continue current POC with emphasis on restoring ankle ROM and LE strength.     Amelia Pan, PTA

## 2023-04-10 ENCOUNTER — CLINICAL SUPPORT (OUTPATIENT)
Dept: REHABILITATION | Facility: HOSPITAL | Age: 38
End: 2023-04-10
Payer: MEDICAID

## 2023-04-10 DIAGNOSIS — M25.672 STIFFNESS OF LEFT ANKLE JOINT: Primary | ICD-10-CM

## 2023-04-10 DIAGNOSIS — R26.89 OTHER ABNORMALITIES OF GAIT AND MOBILITY: ICD-10-CM

## 2023-04-10 DIAGNOSIS — M25.662 STIFFNESS OF LEFT KNEE: ICD-10-CM

## 2023-04-10 PROCEDURE — 97110 THERAPEUTIC EXERCISES: CPT | Mod: PO

## 2023-04-10 NOTE — PROGRESS NOTES
"[facility]   Physical Therapy Progress Note     Name: Tiana Herr  Clinic Number: [id number]    Therapy Diagnosis:   Encounter Diagnoses   Name Primary?    Stiffness of left ankle joint Yes    Other abnormalities of gait and mobility     Stiffness of left knee          Physician: Greg Dockery PA    Visit Date: 4/10/2023  Physician Orders: PT Eval and Treat   Medical Diagnosis from Referral: closed displaced oblique fracture of shaft of left tibia  Evaluation Date: 1/9/2023  Authorization Period Expiration: 12/31/23  Plan of Care Expiration: 4/9/23  Progress Note Due: 4/9/23  Visit # / Visits authorized: 15/17  FOTO: Issued Visit #: 2/3 (3/16/23)    PTA Visit #: 1/5     Precautions: Standard and Weightbearing     Time In: 1708  Time Out: 1748  Total Billable Time: 40 minutes    SUBJECTIVE     Pt reports: that she is doing okay. X-rays showed good alignment. She was compliant with home exercise program.  Response to previous treatment: no adverse effects   Functional change: ambulating out of boot    Pain: 3/10  Location: left ankles     OBJECTIVE     Tiana received the treatments listed below:      therapeutic exercises to develop strength, endurance, ROM, flexibility, posture, and core stabilization for 30 including:  Saphenous nerve glides 2x20   Peroneal nerve glides 2x20  Long sitting gastroc stretch with strap x 1 minute x 3   CW/CCW circles x 20 ea direction  4 way ankle (Green TB) 2x10    BLE shuttle squats 3x10, 87.5#  SL shuttle squats 2x10, 50#   Heel raises on shuttle 2x10, 50#   Standing gastroc stretch on incline x 1 minute x 2   Self DF mobilization on step x 2 minutes, 5 sec hold   Squats to 24" step 3x10    Not performed today:    Tandem stance on ground x 30 sec (B)   Tandem stance on air ex pad x 30 sec (B)   SL stance x 30 sec (B)   SL stance: Y balance x 5 ea   Upright bike x 5 minutes   Ankle ABCs x 2   S/L hip abduction 2x10, 2# NP  Seated inversion sweep " 2x10  Seated arch dome x 2 minutes    manual therapy techniques: Joint mobilizations and Soft tissue Mobilization were applied to the: (L) ankle for 10 minutes, including:  A/P joint mobilizations to improve DF       Patient Education and Home Exercises     Home Exercises Provided and Patient Education Provided     Education provided:   - HEP compliance      Written Home Exercises Provided: Patient instructed to cont prior HEP. Exercises were reviewed and Tiana was able to demonstrate them prior to the end of the session.  Tiana demonstrated good  understanding of the education provided. See EMR under Patient Instructions for exercises provided during therapy sessions    ASSESSMENT     Patient arrived late for session. Overall functional mobility appears to be improving. Some swelling remained. Overall joint mobility improved. Decreased gait abnormalities observed. Continued physical therapy intervention is medically necessary to maximize functional outcomes.      Tiana Is progressing well towards her goals.   Pt prognosis is Good.     Pt will continue to benefit from skilled outpatient physical therapy to address the deficits listed in the problem list box on initial evaluation, provide pt/family education and to maximize pt's level of independence in the home and community environment.     Pt's spiritual, cultural and educational needs considered and pt agreeable to plan of care and goals.     Anticipated barriers to physical therapy: work schedule    Goals:   Short Term Goals: 4 weeks (progressing, not met)  Patient will restore passive left ankle dorsiflexion range of motion to within 5 degrees of contralateral side. Not met  Patient will restore passive left ankle plantarflexion range of motion to within 5 degrees of contralateral side. Not met  Patient will restore passive left ankle inversion range of motion to within 5 degrees of contralateral side. Met  Patient will restore passive left ankle eversion range  of motion to within 5 degrees of contralateral side. Met  Patient will be compliant with home exercise program. Met     Long Term Goals: 8 weeks  (progressing, not met)  Patient will restore left active ankle dorsiflexion range of motion to within 5 degrees of contralateral side. Not met  Patient will restore left active ankle plantarflexion range of motion to within 5 degrees of contralateral side. Not met  Patient will restore left active ankle inversion range of motion to within 5 degrees of contralateral side. Met  Patient will restore  left active ankle eversion range of motion to within 5 degrees of contralateral side. Met  Patient will perform 30 repetitions of straight leg raise with no extensor lag indicating sufficient quad function for gait. Met.  Patient will improve global left ankle strength to > or = 5/5 to allow return to prior level. Partially met  Patient will improve left quadriceps/hamstring strength to > or = 5/5 to allow return to prior level. Not met..  Patient will restore left knee flexion to > or = 120 degrees active in supine. Met.  Patient will be independent in management. Not met.  Patient will ambulate 250' with no evidence of gait abnormalities. Not met.    PLAN     Continue current POC with emphasis on restoring ankle ROM and LE strength.     Yadira Mar, PT

## 2023-07-20 ENCOUNTER — OCCUPATIONAL HEALTH (OUTPATIENT)
Dept: URGENT CARE | Facility: CLINIC | Age: 38
End: 2023-07-20

## 2023-07-20 DIAGNOSIS — Z13.9 ENCOUNTER FOR SCREENING: Primary | ICD-10-CM

## 2023-07-20 LAB
COLLECTION ONLY: NORMAL
CTP QC/QA: YES
POC 10 PANEL DRUG SCREEN: NEGATIVE

## 2023-07-20 PROCEDURE — 80305 DRUG TEST PRSMV DIR OPT OBS: CPT | Mod: S$GLB,,, | Performed by: EMERGENCY MEDICINE

## 2023-07-20 PROCEDURE — 80305 POCT RAPID DRUG SCREEN 10 PANEL: ICD-10-PCS | Mod: S$GLB,,, | Performed by: EMERGENCY MEDICINE
